# Patient Record
Sex: MALE | Race: BLACK OR AFRICAN AMERICAN
[De-identification: names, ages, dates, MRNs, and addresses within clinical notes are randomized per-mention and may not be internally consistent; named-entity substitution may affect disease eponyms.]

---

## 2018-02-22 ENCOUNTER — HOSPITAL ENCOUNTER (EMERGENCY)
Dept: HOSPITAL 74 - FER | Age: 38
Discharge: HOME | End: 2018-02-22
Payer: COMMERCIAL

## 2018-02-22 VITALS — TEMPERATURE: 98.6 F | HEART RATE: 88 BPM | SYSTOLIC BLOOD PRESSURE: 154 MMHG | DIASTOLIC BLOOD PRESSURE: 83 MMHG

## 2018-02-22 VITALS — BODY MASS INDEX: 34.9 KG/M2

## 2018-02-22 DIAGNOSIS — H60.391: Primary | ICD-10-CM

## 2018-02-22 DIAGNOSIS — J02.9: ICD-10-CM

## 2018-02-22 NOTE — PDOC
History of Present Illness





- General


Chief Complaint: Sore Throat


Stated Complaint: THROAT/R EAR PAIN


Time Seen by Provider: 02/22/18 02:09





- History of Present Illness


Initial Comments: 





This otherwise healthy 37-year-old man presents with a few day history of sore 

throat/bodyaches/nonproductive cough/subjective fever.  Tonight, just prior to 

presentation he swabbed out his ear canals after taking a shower.  He developed 

pain in the right ear after this.  There was no bleeding or discharge from the 

ear.  Denies any foreign body in the ear; no previous history of otitis externa.


Patient states that both his wife and daughter have been ill in the last week 

with wife treated with Tamiflu.  Daughter also had a significant sore throat 

although he was unsure if the ch child was treated with Tamiflu.  Patient 

himself has had strep pharyngitis in the last year.





Past History





- Past Medical History


Allergies/Adverse Reactions: 


 Allergies











Allergy/AdvReac Type Severity Reaction Status Date / Time


 


No Known Allergies Allergy   Verified 03/29/16 00:11











Home Medications: 


Ambulatory Orders





Naproxen [Naprosyn -] 500 mg PO BID #14 tablet 03/17/16 


traMADol HCL [Ultram -] 50 mg PO Q6H #20 tablet 03/29/16 








COPD: No


Thyroid Disease: No





- Immunization History


Immunization Up to Date: Yes





- Suicide/Smoking/Psychosocial Hx


Smoking Status: No


Smoking History: Unknown if ever smoked


Have you smoked in the past 12 months: No


Number of Cigarettes Smoked Daily: 0


Cigars Per Day: 0


Information on smoking cessation initiated: No


Hx Alcohol Use: No


Drug/Substance Use Hx: No


Substance Use Type: None





**Review of Systems





- Review of Systems


Able to Perform ROS?: Yes


Comments:: 





12 point review of systems is negative except for what is noted in the history 

of present illness








*Physical Exam





- Vital Signs


 Last Vital Signs











Temp Pulse Resp BP Pulse Ox


 


 98.6 F   88   14   154/83   99 


 


 02/22/18 02:06  02/22/18 02:06  02/22/18 02:06  02/22/18 02:06  02/22/18 02:06














- Physical Exam


Comments: 





GENERAL: Adult male, [place] oriented 3, in no acute distress


HEAD: Normal with no signs of trauma.


EYES: PERRLA, EOMI, sclera anicteric, conjunctiva clear.


ENT: Right ear.  Moderately erythematous/edematous canal with scaly residue; TM 

partially visualized and does not appear to be erythematous or dull


          Left ear-normal canal and TM


           Pharynx moderately erythematous with scattered small areas of exudate


NECK: Normal range of motion, supple without lymphadenopathy, JVD, or masses.


LUNGS: Breath sounds equal, clear to auscultation bilaterally.  No wheezes, and 

no crackles.


HEART:Regular rate and rhythm, normal S1 and S2 without murmur, rub or gallop.


SKIN: Warm, Dry, normal turgor, no rashes or lesions noted.








Medical Decision Making





- Medical Decision Making





This 37-year-old otherwise healthy man presents with right ear pain after 

having upper respiratory infection/pharyngitis/fever for a few days.  On exam, 

there is evidence of acute otitis externa on the right side without other 

abnormalities except acute pharyngitis.


Because patient has had strep throat as an adult quick strep/throat culture 

will be sent.


The patient's wife was treated with Tamiflu for presumed influenza last week; 

nasopharyngeal swab for rapid influenza test will be sent





Meanwhile, 4 drops of Corticosporin otic suspension placed in the right ear.  

The patient will continue this 4 times a day for one week.  He will be referred 

to Dr. Oliverio barriga for ENT follow-up.


Patient will be contacted if either his quick strep/throat culture or influenza 

swab is positive.


He should return to the ER if he Has worsening pain or develops fever/

difficulty breathing








*DC/Admit/Observation/Transfer


Diagnosis at time of Disposition: 


Acute otitis externa of right ear


Qualifiers:


 Otitis externa type: other infective Qualified Code(s): H60.391 - Other 

infective otitis externa, right ear





Pharyngitis


Qualifiers:


 Pharyngitis/tonsillitis etiology: unspecified etiology Qualified Code(s): 

J02.9 - Acute pharyngitis, unspecified








- Discharge Dispostion


Disposition: HOME


Condition at time of disposition: Good





- Referrals


Referrals: 


Giacomo Duron MD [Staff Physician] - 





- Patient Instructions


Printed Discharge Instructions:  DI for Otitis Externa


Additional Instructions: 


Cortisporin suspension drops: 4 drops in right ear 4 times a day for 1 week


Ibuprofen/naproxen/acetaminophen as needed for pain


Decongestant/antihistamine as needed for nasal congestion/ear pain


Drink plenty of fluids


Follow-up with ENT group (Dr. Duron) if you have persistent pain in your ear


Medications will be called into your pharmacy if strep or influenza test is 

positive





- Post Discharge Activity

## 2020-12-23 ENCOUNTER — HOSPITAL ENCOUNTER (EMERGENCY)
Dept: HOSPITAL 74 - FER | Age: 40
Discharge: HOME | End: 2020-12-23
Payer: COMMERCIAL

## 2020-12-23 VITALS — BODY MASS INDEX: 35.2 KG/M2

## 2020-12-23 VITALS — TEMPERATURE: 99 F | HEART RATE: 85 BPM | DIASTOLIC BLOOD PRESSURE: 84 MMHG | SYSTOLIC BLOOD PRESSURE: 135 MMHG

## 2020-12-23 DIAGNOSIS — M54.5: Primary | ICD-10-CM
